# Patient Record
Sex: MALE | Race: WHITE | NOT HISPANIC OR LATINO | URBAN - NONMETROPOLITAN AREA
[De-identification: names, ages, dates, MRNs, and addresses within clinical notes are randomized per-mention and may not be internally consistent; named-entity substitution may affect disease eponyms.]

---

## 2017-01-10 DIAGNOSIS — M10.9 GOUTY ARTHROPATHY: ICD-10-CM

## 2017-01-10 RX ORDER — INDOMETHACIN 50 MG/1
50 CAPSULE ORAL 3 TIMES DAILY
Qty: 30 CAP | Refills: 2 | Status: SHIPPED | OUTPATIENT
Start: 2017-01-10 | End: 2017-05-17

## 2017-05-17 ENCOUNTER — OFFICE VISIT (OUTPATIENT)
Dept: URGENT CARE | Facility: PHYSICIAN GROUP | Age: 37
End: 2017-05-17
Payer: COMMERCIAL

## 2017-05-17 VITALS
HEIGHT: 70 IN | TEMPERATURE: 98.1 F | RESPIRATION RATE: 20 BRPM | SYSTOLIC BLOOD PRESSURE: 140 MMHG | WEIGHT: 232 LBS | DIASTOLIC BLOOD PRESSURE: 80 MMHG | HEART RATE: 81 BPM | OXYGEN SATURATION: 97 % | BODY MASS INDEX: 33.21 KG/M2

## 2017-05-17 DIAGNOSIS — M54.2 NECK PAIN: ICD-10-CM

## 2017-05-17 PROCEDURE — 99204 OFFICE O/P NEW MOD 45 MIN: CPT | Performed by: PHYSICIAN ASSISTANT

## 2017-05-17 RX ORDER — PREDNISONE 10 MG/1
TABLET ORAL
Qty: 20 TAB | Refills: 0 | Status: SHIPPED | OUTPATIENT
Start: 2017-05-17

## 2017-05-17 RX ORDER — CYCLOBENZAPRINE HCL 10 MG
10 TABLET ORAL 3 TIMES DAILY PRN
Qty: 15 TAB | Refills: 0 | Status: SHIPPED | OUTPATIENT
Start: 2017-05-17

## 2017-05-17 ASSESSMENT — ENCOUNTER SYMPTOMS
PHOTOPHOBIA: 0
SYNCOPE: 0
FEVER: 0
PARESIS: 0
TINGLING: 0
VISUAL CHANGE: 0
SENSORY CHANGE: 0
FOCAL WEAKNESS: 0
WEAKNESS: 0
NUMBNESS: 0
LEG PAIN: 0
HEADACHES: 0
BACK PAIN: 0
TROUBLE SWALLOWING: 0
WEIGHT LOSS: 0
NECK PAIN: 1

## 2017-05-17 NOTE — Clinical Note
May 17, 2017         Patient: Juliano Meyer   YOB: 1980   Date of Visit: 5/17/2017           To Whom it May Concern:    Juliano Meyer was seen in my clinic on 5/17/2017. Please excuse recent absences. He may return Friday as long as symptoms have improved.    If you have any questions or concerns, please don't hesitate to call.        Sincerely,           Karthik Sabillon PA-C  Electronically Signed

## 2017-05-17 NOTE — MR AVS SNAPSHOT
"        Juliano Nunezradha   2017 2:45 PM   Office Visit   MRN: [id number]    Department:  [facility]   Dept Phone:  [phone]    Description:  Male : 1980   Provider:  SAIDA Maddox           Reason for Visit     Neck Pain Pt states seen at ER Monday for pain/swelling in R side neck/shoulder/jaw      Allergies as of 2017     No Known Allergies      You were diagnosed with     Neck pain   [[id number]]         Vital Signs     Blood Pressure Pulse Temperature Respirations Height Weight    140/80 mmHg 81 36.7 °C (98.1 °F) 20 1.778 m (5' 10\") 105.235 kg (232 lb)    Body Mass Index Oxygen Saturation Smoking Status             33.29 kg/m2 97% Never Smoker          Basic Information     Date Of Birth Sex Race Ethnicity Preferred Language    1980 Male White Non- English      Health Maintenance        Date Due Completion Dates    IMM DTaP/Tdap/Td Vaccine (1 - Tdap) 2001 ---            Current Immunizations     No immunizations on file.      Below and/or attached are the medications your provider expects you to take. Review all of your home medications and newly ordered medications with your provider and/or pharmacist. Follow medication instructions as directed by your provider and/or pharmacist. Please keep your medication list with you and share with your provider. Update the information when medications are discontinued, doses are changed, or new medications (including over-the-counter products) are added; and carry medication information at all times in the event of emergency situations     Allergies:  No Known Allergies          Medications  Valid as of: May 17, 2017 -  3:22 PM    Generic Name Brand Name Tablet Size Instructions for use    Cyclobenzaprine HCl (Tab) FLEXERIL 10 MG Take 1 Tab by mouth 3 times a day as needed.        PredniSONE (Tab) DELTASONE 10 MG 2 tabs BID x 2 days, 3 tabs daily x 2 days, 2 tabs daily x 2 days, 1 tab daily x 2 days        .                 "   Medicines prescribed today were sent to:     [id number] - [place], [place] - [address] AT [facility] [address]    [address] [place] [address]    Phone: [phone] Fax: [phone]    Open 24 Hours?: No      Medication refill instructions:       If your prescription bottle indicates you have medication refills left, it is not necessary to call your provider’s office. Please contact your pharmacy and they will refill your medication.    If your prescription bottle indicates you do not have any refills left, you may request refills at any time through one of the following ways: The online [employer] system (except Urgent Care), by calling your provider’s office, or by asking your pharmacy to contact your provider’s office with a refill request. Medication refills are processed only during regular business hours and may not be available until the next business day. Your provider may request additional information or to have a follow-up visit with you prior to refilling your medication.   *Please Note: Medication refills are assigned a new Rx number when refilled electronically. Your pharmacy may indicate that no refills were authorized even though a new prescription for the same medication is available at the pharmacy. Please request the medicine by name with the pharmacy before contacting your provider for a refill.        Instructions    Cervical Sprain  A cervical sprain is an injury in the neck in which the strong, fibrous tissues (ligaments) that connect your neck bones stretch or tear. Cervical sprains can range from mild to severe. Severe cervical sprains can cause the neck vertebrae to be unstable. This can lead to damage of the spinal cord and can result in serious nervous system problems. The amount of time it takes for a cervical sprain to get better depends on the cause and extent of the injury. Most cervical sprains heal in 1 to 3 weeks.  CAUSES   Severe cervical sprains may be  caused by:   · Contact sport injuries (such as from football, rugby, wrestling, hockey, auto racing, gymnastics, diving, martial arts, or boxing).    · Motor vehicle collisions.    · Whiplash injuries. This is an injury from a sudden forward and backward whipping movement of the head and neck.   · Falls.    Mild cervical sprains may be caused by:   · Being in an awkward position, such as while cradling a telephone between your ear and shoulder.    · Sitting in a chair that does not offer proper support.    · Working at a poorly designed computer station.    · Looking up or down for long periods of time.    SYMPTOMS   · Pain, soreness, stiffness, or a burning sensation in the front, back, or sides of the neck. This discomfort may develop immediately after the injury or slowly, 24 hours or more after the injury.    · Pain or tenderness directly in the middle of the back of the neck.    · Shoulder or upper back pain.    · Limited ability to move the neck.    · Headache.    · Dizziness.    · Weakness, numbness, or tingling in the hands or arms.    · Muscle spasms.    · Difficulty swallowing or chewing.    · Tenderness and swelling of the neck.    DIAGNOSIS   Most of the time your health care provider can diagnose a cervical sprain by taking your history and doing a physical exam. Your health care provider will ask about previous neck injuries and any known neck problems, such as arthritis in the neck. X-rays may be taken to find out if there are any other problems, such as with the bones of the neck. Other tests, such as a CT scan or MRI, may also be needed.   TREATMENT   Treatment depends on the severity of the cervical sprain. Mild sprains can be treated with rest, keeping the neck in place (immobilization), and pain medicines. Severe cervical sprains are immediately immobilized. Further treatment is done to help with pain, muscle spasms, and other symptoms and may include:  · Medicines, such as pain relievers,  numbing medicines, or muscle relaxants.    · Physical therapy. This may involve stretching exercises, strengthening exercises, and posture training. Exercises and improved posture can help stabilize the neck, strengthen muscles, and help stop symptoms from returning.    HOME CARE INSTRUCTIONS   · Put ice on the injured area.    ¨ Put ice in a plastic bag.    ¨ Place a towel between your skin and the bag.    ¨ Leave the ice on for 15-20 minutes, 3-4 times a day.    · If your injury was severe, you may have been given a cervical collar to wear. A cervical collar is a two-piece collar designed to keep your neck from moving while it heals.  ¨ Do not remove the collar unless instructed by your health care provider.  ¨ If you have long hair, keep it outside of the collar.  ¨ Ask your health care provider before making any adjustments to your collar. Minor adjustments may be required over time to improve comfort and reduce pressure on your chin or on the back of your head.  ¨ If you are allowed to remove the collar for cleaning or bathing, follow your health care provider's instructions on how to do so safely.  ¨ Keep your collar clean by wiping it with mild soap and water and drying it completely. If the collar you have been given includes removable pads, remove them every 1-2 days and hand wash them with soap and water. Allow them to air dry. They should be completely dry before you wear them in the collar.  ¨ If you are allowed to remove the collar for cleaning and bathing, wash and dry the skin of your neck. Check your skin for irritation or sores. If you see any, tell your health care provider.  ¨ Do not drive while wearing the collar.    · Only take over-the-counter or prescription medicines for pain, discomfort, or fever as directed by your health care provider.    · Keep all follow-up appointments as directed by your health care provider.    · Keep all physical therapy appointments as directed by your health care  provider.    · Make any needed adjustments to your workstation to promote good posture.    · Avoid positions and activities that make your symptoms worse.    · Warm up and stretch before being active to help prevent problems.    SEEK MEDICAL CARE IF:   · Your pain is not controlled with medicine.    · You are unable to decrease your pain medicine over time as planned.    · Your activity level is not improving as expected.    SEEK IMMEDIATE MEDICAL CARE IF:   · You develop any bleeding.  · You develop stomach upset.  · You have signs of an allergic reaction to your medicine.    · Your symptoms get worse.    · You develop new, unexplained symptoms.    · You have numbness, tingling, weakness, or paralysis in any part of your body.    MAKE SURE YOU:   · Understand these instructions.  · Will watch your condition.  · Will get help right away if you are not doing well or get worse.     This information is not intended to replace advice given to you by your health care provider. Make sure you discuss any questions you have with your health care provider.     Document Released: 10/14/2008 Document Revised: 12/23/2014 Document Reviewed: 06/25/2014  [employer] Interactive Patient Education ©2016 [employer]    Trigeminal Neuralgia  Trigeminal neuralgia is a nerve disorder that causes sudden attacks of severe facial pain. It is caused by damage to the trigeminal nerve, a major nerve in the face. It is more common in women and in the elderly, although it can also happen in younger patients. Attacks last from a few seconds to several minutes and can occur from a couple of times per year to several times per day. Trigeminal neuralgia can be a very distressing and disabling condition. Surgery may be needed in very severe cases if medical treatment does not give relief.  HOME CARE INSTRUCTIONS   · If your caregiver prescribed medication to help prevent attacks, take as directed.  · To help prevent attacks:  ¨ Chew on the unaffected  side of the mouth.  ¨ Avoid touching your face.  ¨ Avoid blasts of hot or cold air.  ¨ Men may wish to grow a beard to avoid having to shave.  SEEK IMMEDIATE MEDICAL CARE IF:  · Pain is unbearable and your medicine does not help.  · You develop new, unexplained symptoms (problems).  · You have problems that may be related to a medication you are taking.     This information is not intended to replace advice given to you by your health care provider. Make sure you discuss any questions you have with your health care provider.     Document Released: 12/15/2001 Document Revised: 03/11/2013 Document Reviewed: 04/11/2016  [employer] Interactive Patient Education ©2016 [employer]              MyChart Status: Patient Declined

## 2017-05-17 NOTE — PATIENT INSTRUCTIONS
Cervical Sprain  A cervical sprain is an injury in the neck in which the strong, fibrous tissues (ligaments) that connect your neck bones stretch or tear. Cervical sprains can range from mild to severe. Severe cervical sprains can cause the neck vertebrae to be unstable. This can lead to damage of the spinal cord and can result in serious nervous system problems. The amount of time it takes for a cervical sprain to get better depends on the cause and extent of the injury. Most cervical sprains heal in 1 to 3 weeks.  CAUSES   Severe cervical sprains may be caused by:   · Contact sport injuries (such as from football, rugby, wrestling, hockey, auto racing, gymnastics, diving, martial arts, or boxing).    · Motor vehicle collisions.    · Whiplash injuries. This is an injury from a sudden forward and backward whipping movement of the head and neck.   · Falls.    Mild cervical sprains may be caused by:   · Being in an awkward position, such as while cradling a telephone between your ear and shoulder.    · Sitting in a chair that does not offer proper support.    · Working at a poorly designed computer station.    · Looking up or down for long periods of time.    SYMPTOMS   · Pain, soreness, stiffness, or a burning sensation in the front, back, or sides of the neck. This discomfort may develop immediately after the injury or slowly, 24 hours or more after the injury.    · Pain or tenderness directly in the middle of the back of the neck.    · Shoulder or upper back pain.    · Limited ability to move the neck.    · Headache.    · Dizziness.    · Weakness, numbness, or tingling in the hands or arms.    · Muscle spasms.    · Difficulty swallowing or chewing.    · Tenderness and swelling of the neck.    DIAGNOSIS   Most of the time your health care provider can diagnose a cervical sprain by taking your history and doing a physical exam. Your health care provider will ask about previous neck injuries and any known neck  problems, such as arthritis in the neck. X-rays may be taken to find out if there are any other problems, such as with the bones of the neck. Other tests, such as a CT scan or MRI, may also be needed.   TREATMENT   Treatment depends on the severity of the cervical sprain. Mild sprains can be treated with rest, keeping the neck in place (immobilization), and pain medicines. Severe cervical sprains are immediately immobilized. Further treatment is done to help with pain, muscle spasms, and other symptoms and may include:  · Medicines, such as pain relievers, numbing medicines, or muscle relaxants.    · Physical therapy. This may involve stretching exercises, strengthening exercises, and posture training. Exercises and improved posture can help stabilize the neck, strengthen muscles, and help stop symptoms from returning.    HOME CARE INSTRUCTIONS   · Put ice on the injured area.    ¨ Put ice in a plastic bag.    ¨ Place a towel between your skin and the bag.    ¨ Leave the ice on for 15-20 minutes, 3-4 times a day.    · If your injury was severe, you may have been given a cervical collar to wear. A cervical collar is a two-piece collar designed to keep your neck from moving while it heals.  ¨ Do not remove the collar unless instructed by your health care provider.  ¨ If you have long hair, keep it outside of the collar.  ¨ Ask your health care provider before making any adjustments to your collar. Minor adjustments may be required over time to improve comfort and reduce pressure on your chin or on the back of your head.  ¨ If you are allowed to remove the collar for cleaning or bathing, follow your health care provider's instructions on how to do so safely.  ¨ Keep your collar clean by wiping it with mild soap and water and drying it completely. If the collar you have been given includes removable pads, remove them every 1-2 days and hand wash them with soap and water. Allow them to air dry. They should be completely  dry before you wear them in the collar.  ¨ If you are allowed to remove the collar for cleaning and bathing, wash and dry the skin of your neck. Check your skin for irritation or sores. If you see any, tell your health care provider.  ¨ Do not drive while wearing the collar.    · Only take over-the-counter or prescription medicines for pain, discomfort, or fever as directed by your health care provider.    · Keep all follow-up appointments as directed by your health care provider.    · Keep all physical therapy appointments as directed by your health care provider.    · Make any needed adjustments to your workstation to promote good posture.    · Avoid positions and activities that make your symptoms worse.    · Warm up and stretch before being active to help prevent problems.    SEEK MEDICAL CARE IF:   · Your pain is not controlled with medicine.    · You are unable to decrease your pain medicine over time as planned.    · Your activity level is not improving as expected.    SEEK IMMEDIATE MEDICAL CARE IF:   · You develop any bleeding.  · You develop stomach upset.  · You have signs of an allergic reaction to your medicine.    · Your symptoms get worse.    · You develop new, unexplained symptoms.    · You have numbness, tingling, weakness, or paralysis in any part of your body.    MAKE SURE YOU:   · Understand these instructions.  · Will watch your condition.  · Will get help right away if you are not doing well or get worse.     This information is not intended to replace advice given to you by your health care provider. Make sure you discuss any questions you have with your health care provider.     Document Released: 10/14/2008 Document Revised: 12/23/2014 Document Reviewed: 06/25/2014  [employer] Interactive Patient Education ©2016 [employer]    Trigeminal Neuralgia  Trigeminal neuralgia is a nerve disorder that causes sudden attacks of severe facial pain. It is caused by damage to the trigeminal nerve, a  major nerve in the face. It is more common in women and in the elderly, although it can also happen in younger patients. Attacks last from a few seconds to several minutes and can occur from a couple of times per year to several times per day. Trigeminal neuralgia can be a very distressing and disabling condition. Surgery may be needed in very severe cases if medical treatment does not give relief.  HOME CARE INSTRUCTIONS   · If your caregiver prescribed medication to help prevent attacks, take as directed.  · To help prevent attacks:  ¨ Chew on the unaffected side of the mouth.  ¨ Avoid touching your face.  ¨ Avoid blasts of hot or cold air.  ¨ Men may wish to grow a beard to avoid having to shave.  SEEK IMMEDIATE MEDICAL CARE IF:  · Pain is unbearable and your medicine does not help.  · You develop new, unexplained symptoms (problems).  · You have problems that may be related to a medication you are taking.     This information is not intended to replace advice given to you by your health care provider. Make sure you discuss any questions you have with your health care provider.     Document Released: 12/15/2001 Document Revised: 03/11/2013 Document Reviewed: 04/11/2016  [employer] Interactive Patient Education ©2016 [employer]

## 2017-05-17 NOTE — PROGRESS NOTES
Subjective:      Juliano Meyer is a 37 y.o. male who presents with Neck Pain            HPI Comments: Patient presents today with right-sided neck pain for the last 3 days. Neck pain started suddenly 3 days ago and has been fluctuating ever since. At the onset of neck pain and also had pain on the right side had an face. He was seen in the emergency department but not given a clear diagnosis. He was not given any medication. He was instructed to follow-up with PCP, but does not have a PCP. He needs a note excusing him from work. He reports that the pain in the head and face has improved significantly but he continues to report pain in the right side of the neck which is much worse with movement and palpation.    Neck Pain   This is a new problem. The current episode started in the past 7 days. The problem occurs constantly. The problem has been waxing and waning. The pain is associated with nothing. The pain is present in the right side. The quality of the pain is described as aching, stabbing and cramping. The pain is moderate. The symptoms are aggravated by twisting and position. Pertinent negatives include no chest pain, fever, headaches, leg pain, numbness, pain with swallowing, paresis, photophobia, syncope, tingling, trouble swallowing, visual change, weakness or weight loss. He has tried NSAIDs for the symptoms. The treatment provided mild relief.       Review of Systems   Constitutional: Negative for fever and weight loss.   HENT: Negative for trouble swallowing.    Eyes: Negative for photophobia.   Cardiovascular: Negative for chest pain and syncope.   Musculoskeletal: Positive for neck pain. Negative for back pain.   Neurological: Negative for tingling, sensory change, focal weakness, weakness, numbness and headaches.     Allergies:Review of patient's allergies indicates no known allergies.    Current Outpatient Prescriptions Ordered in [facility]   Medication Sig Dispense Refill   • cyclobenzaprine  "(FLEXERIL) 10 MG Tab Take 1 Tab by mouth 3 times a day as needed. 15 Tab 0   • predniSONE (DELTASONE) 10 MG Tab 2 tabs BID x 2 days, 3 tabs daily x 2 days, 2 tabs daily x 2 days, 1 tab daily x 2 days 20 Tab 0     No current Epic-ordered facility-administered medications on file.       History reviewed. No pertinent past medical history.    Social History   Substance Use Topics   • Smoking status: Never Smoker    • Smokeless tobacco: Never Used   • Alcohol Use: Yes      Comment: occ       No family status information on file.   History reviewed. No pertinent family history.       Objective:     /80 mmHg  Pulse 81  Temp(Src) 36.7 °C (98.1 °F)  Resp 20  Ht 1.778 m (5' 10\")  Wt 105.235 kg (232 lb)  BMI 33.29 kg/m2  SpO2 97%     Physical Exam   Constitutional: He is oriented to person, place, and time. He appears well-developed and well-nourished. No distress.   HENT:   Head: Normocephalic and atraumatic.   No tenderness or change in sensation over the right side of the face   Eyes: Right eye exhibits no discharge. Left eye exhibits no discharge.   Neck:   Right-sided muscular tenderness over the anterior and lateral aspect of the neck. No posterior or left-sided tenderness. No midline/bony tenderness. Mildly decreased range of motion secondary to discomfort.   Cardiovascular: Normal rate.    Pulmonary/Chest: Effort normal.   Neurological: He is alert and oriented to person, place, and time.   Skin: Skin is warm and dry. He is not diaphoretic.   Psychiatric: He has a normal mood and affect. His behavior is normal. Judgment and thought content normal.   Nursing note and vitals reviewed.              Assessment/Plan:     1. Neck pain  cyclobenzaprine (FLEXERIL) 10 MG Tab    predniSONE (DELTASONE) 10 MG Tab    Ongoing for 3 days. Muscular tenderness on the right side of the neck. Start prednisone and Flexeril. Follow up with PCP. Return if worsening       [employer] Interactive Patient Education given: Neck pain, " trigeminal neuralgia    Please note that this dictation was created using voice recognition software. I have made every reasonable attempt to correct obvious errors, but I expect that there are errors of grammar and possibly content that I did not discover before finalizing the note.

## 2025-01-09 ENCOUNTER — OFFICE VISIT (OUTPATIENT)
Dept: URGENT CARE | Facility: PHYSICIAN GROUP | Age: 45
End: 2025-01-09
Payer: COMMERCIAL

## 2025-01-09 VITALS
SYSTOLIC BLOOD PRESSURE: 118 MMHG | HEART RATE: 66 BPM | TEMPERATURE: 96.6 F | DIASTOLIC BLOOD PRESSURE: 78 MMHG | OXYGEN SATURATION: 98 % | RESPIRATION RATE: 16 BRPM

## 2025-01-09 DIAGNOSIS — K64.9 HEMORRHOIDS, UNSPECIFIED HEMORRHOID TYPE: ICD-10-CM

## 2025-01-09 PROCEDURE — 99203 OFFICE O/P NEW LOW 30 MIN: CPT | Performed by: NURSE PRACTITIONER

## 2025-01-09 PROCEDURE — 3078F DIAST BP <80 MM HG: CPT | Performed by: NURSE PRACTITIONER

## 2025-01-09 PROCEDURE — 3074F SYST BP LT 130 MM HG: CPT | Performed by: NURSE PRACTITIONER

## 2025-01-09 RX ORDER — HYDROCORTISONE ACETATE 25 MG/1
SUPPOSITORY RECTAL
Qty: 20 SUPPOSITORY | Refills: 0 | Status: SHIPPED | OUTPATIENT
Start: 2025-01-09

## 2025-01-09 RX ORDER — HYDROCORTISONE 25 MG/G
1 CREAM TOPICAL DAILY
Qty: 30 G | Refills: 0 | Status: SHIPPED | OUTPATIENT
Start: 2025-01-09

## 2025-01-09 NOTE — PROGRESS NOTES
Subjective:   Juliano Meyer is a 44 y.o. male who presents for Hemorrhoids (X2 days possible hemorrhoids, swelling, discomfort, no bleeding)        Patient is a 44-year-old male presenting clinic today reporting 2-day history of rectal pain, swelling, discomfort, and tenderness.  Patient states that he has been slightly constipated over the past week and was straining to have a bowel movement where he felt sudden pain in his rectal area.  Patient denies any bleeding of his rectum.  He does state that there is a slight swelling or protrusion.  Patient has not had any nausea, vomiting, diarrhea, or fevers.  He has been using over-the-counter Preparation H.    Medications, Allergies, and current problem list reviewed today in Epic.     Objective:     /78   Pulse 66   Temp 35.9 °C (96.6 °F) (Temporal)   Resp 16   SpO2 98%     Physical Exam  Vitals reviewed.   Constitutional:       General: He is not in acute distress.     Appearance: Normal appearance. He is not ill-appearing.   HENT:      Head: Normocephalic.      Nose: Nose normal.      Mouth/Throat:      Mouth: Mucous membranes are moist.   Eyes:      Extraocular Movements: Extraocular movements intact.      Conjunctiva/sclera: Conjunctivae normal.      Pupils: Pupils are equal, round, and reactive to light.   Cardiovascular:      Rate and Rhythm: Normal rate.   Pulmonary:      Effort: Pulmonary effort is normal.   Abdominal:      General: Abdomen is flat. Bowel sounds are decreased. There is no distension.      Palpations: Abdomen is soft. There is no hepatomegaly or splenomegaly.      Tenderness: There is no abdominal tenderness. There is no guarding or rebound. Negative signs include Shah's sign and McBurney's sign.   Genitourinary:     Comments: Patient deferred rectal exam  Musculoskeletal:         General: Normal range of motion.      Cervical back: Normal range of motion and neck supple.   Skin:     General: Skin is warm and dry.    Neurological:      General: No focal deficit present.      Mental Status: He is alert and oriented to person, place, and time.   Psychiatric:         Mood and Affect: Mood normal.         Behavior: Behavior normal.         Assessment/Plan:     Diagnosis and associated orders:     1. Hemorrhoids, unspecified hemorrhoid type  hydrocortisone rectal (PERIANAL) 2.5% Cream    hydrocortisone (ANUSOL-HC) 25 MG Suppos         Comments/MDM:     Signs and symptoms consistent with an external hemorrhoid. No hematochezia reported prompting further workup.  Patient has declined rectal exam.  Abdominal exam does have mildly decreased bowel sounds, abdomen is nontender.  No acute abdomen symptoms.  Negative for distention.  Vital signs are reasonable.  Conservative management to be followed. Will consider surgical consult in follow up, especially if there are worsening symptoms. I spent a significant amount of time reviewing sitz baths, hygiene, and prescription cream. Patient may consider using a donut pillow.  This is a chronic issue with exacerbation. Will prescribe Anusol.  Patient was involved with shared decision-making throughout the exam today and verbalizes understanding regards to plan of care, discharge instructions, and follow-up         Differential diagnosis, natural history, supportive care, and indications for immediate follow-up discussed.    Advised the patient to follow-up with the primary care physician for recheck, reevaluation, and consideration of further management.    I personally reviewed prior external notes and test results pertinent to today's visit as well as additional imaging and testing completed in clinic today.     Please note that this dictation was created using voice recognition software. I have made a reasonable attempt to correct obvious errors, but I expect that there are errors of grammar and possibly content that I did not discover before finalizing the note.

## 2025-04-14 ENCOUNTER — OFFICE VISIT (OUTPATIENT)
Dept: URGENT CARE | Facility: PHYSICIAN GROUP | Age: 45
End: 2025-04-14
Payer: COMMERCIAL

## 2025-04-14 VITALS
WEIGHT: 232.8 LBS | TEMPERATURE: 98.3 F | HEART RATE: 71 BPM | BODY MASS INDEX: 33.4 KG/M2 | OXYGEN SATURATION: 96 % | RESPIRATION RATE: 16 BRPM | DIASTOLIC BLOOD PRESSURE: 80 MMHG | SYSTOLIC BLOOD PRESSURE: 118 MMHG

## 2025-04-14 DIAGNOSIS — B34.9 ACUTE VIRAL SYNDROME: ICD-10-CM

## 2025-04-14 DIAGNOSIS — J02.9 PHARYNGITIS, UNSPECIFIED ETIOLOGY: ICD-10-CM

## 2025-04-14 LAB — S PYO DNA SPEC NAA+PROBE: NOT DETECTED

## 2025-04-14 PROCEDURE — 3079F DIAST BP 80-89 MM HG: CPT | Performed by: PHYSICIAN ASSISTANT

## 2025-04-14 PROCEDURE — 99213 OFFICE O/P EST LOW 20 MIN: CPT | Performed by: PHYSICIAN ASSISTANT

## 2025-04-14 PROCEDURE — 3074F SYST BP LT 130 MM HG: CPT | Performed by: PHYSICIAN ASSISTANT

## 2025-04-14 PROCEDURE — 87651 STREP A DNA AMP PROBE: CPT | Performed by: PHYSICIAN ASSISTANT

## 2025-04-14 NOTE — PROGRESS NOTES
Subjective     Juliano Meyer is a 45 y.o. male who presents with Sore Throat (Sore throat since Thursday, worsening. Sinus pressure. Stomach ache. Diarrhea. )    HPI:  Juliano Meyer is a 45 y.o. male who presents today for evaluation of URI symptoms.  Has been sick with URI symptoms for the past 4 days or so.  Sore throat has been worsening although he feels little bit better this afternoon.  He has also had some congestion and sinus pressure which worsened in the last 24 hours and today he is also developed diarrhea.  He has not had any measured fever.  No chills or bodyaches.  Has been using over-the-counter medications which provides mild relief of symptoms.        ROS        PMH:  has no past medical history on file.  MEDS:   Current Outpatient Medications:     BUPROPION HCL PO, Take  by mouth., Disp: , Rfl:     NALTREXONE HCL PO, Take  by mouth., Disp: , Rfl:     hydrocortisone rectal (PERIANAL) 2.5% Cream, Insert 1 g into the rectum every day. (Patient not taking: Reported on 4/14/2025), Disp: 30 g, Rfl: 0    hydrocortisone (ANUSOL-HC) 25 MG Suppos, Insert 1 suppository in rectum every 12 hours only if needed for hemorrhoids or blood in stool. (Patient not taking: Reported on 4/14/2025), Disp: 20 Suppository, Rfl: 0  ALLERGIES: No Known Allergies  SURGHX: No past surgical history on file.  SOCHX:  reports that he has never smoked. He has never used smokeless tobacco. He reports current alcohol use. He reports that he does not use drugs.  FH: Family history was reviewed, no pertinent findings to report      Objective     /80   Pulse 71   Temp 36.8 °C (98.3 °F) (Temporal)   Resp 16   Wt 106 kg (232 lb 12.8 oz)   SpO2 96%   BMI 33.40 kg/m²      Physical Exam  Constitutional:       Appearance: He is well-developed.   HENT:      Head: Normocephalic and atraumatic.      Right Ear: Tympanic membrane, ear canal and external ear normal.      Left Ear: Tympanic membrane, ear canal and external ear  normal.      Nose: Mucosal edema and congestion present. No rhinorrhea.      Mouth/Throat:      Lips: Pink.      Mouth: Mucous membranes are moist.      Pharynx: Oropharynx is clear.   Eyes:      Conjunctiva/sclera: Conjunctivae normal.      Pupils: Pupils are equal, round, and reactive to light.   Cardiovascular:      Rate and Rhythm: Normal rate and regular rhythm.      Heart sounds: Normal heart sounds. No murmur heard.  Pulmonary:      Effort: Pulmonary effort is normal.      Breath sounds: Normal breath sounds. No wheezing.   Musculoskeletal:      Cervical back: Normal range of motion.   Lymphadenopathy:      Cervical: Cervical adenopathy present.   Skin:     General: Skin is warm and dry.      Capillary Refill: Capillary refill takes less than 2 seconds.   Neurological:      Mental Status: He is alert and oriented to person, place, and time.   Psychiatric:         Behavior: Behavior normal.         Judgment: Judgment normal.         POCT GROUP A STREP, PCR - Negative    Assessment & Plan     1. Pharyngitis, unspecified etiology  - POCT GROUP A STREP, PCR    2. Acute viral syndrome  PCR strep test negative.  Constellation of symptoms is more consistent with viral etiology.  Recommend use of over-the-counter medications and supportive care measures.  If still no significant improvement in symptoms towards this weekend did discuss that he can try to contact me and we can reconsider trial of antibiotics if needed.  - OTC cold/flu medications  -Supportive care also discussed to include the use of saline nasal rinses, steam inhalation, and the use of a cool-mist humidifier in the bedroom at night.  - PO fluids  - Rest  - Tylenol or ibuprofen as needed for fever > 100.4 F